# Patient Record
Sex: FEMALE | URBAN - METROPOLITAN AREA
[De-identification: names, ages, dates, MRNs, and addresses within clinical notes are randomized per-mention and may not be internally consistent; named-entity substitution may affect disease eponyms.]

---

## 2021-05-28 ENCOUNTER — LAB OUTSIDE AN ENCOUNTER (OUTPATIENT)
Dept: URBAN - METROPOLITAN AREA TELEHEALTH 2 | Facility: TELEHEALTH | Age: 46
End: 2021-05-28

## 2021-05-28 ENCOUNTER — OFFICE VISIT (OUTPATIENT)
Dept: URBAN - METROPOLITAN AREA TELEHEALTH 2 | Facility: TELEHEALTH | Age: 46
End: 2021-05-28
Payer: COMMERCIAL

## 2021-05-28 ENCOUNTER — TELEPHONE ENCOUNTER (OUTPATIENT)
Dept: URBAN - METROPOLITAN AREA CLINIC 92 | Facility: CLINIC | Age: 46
End: 2021-05-28

## 2021-05-28 ENCOUNTER — DASHBOARD ENCOUNTERS (OUTPATIENT)
Age: 46
End: 2021-05-28

## 2021-05-28 DIAGNOSIS — N18.6 END STAGE RENAL DISEASE: ICD-10-CM

## 2021-05-28 DIAGNOSIS — R19.5 POSITIVE COLORECTAL CANCER SCREENING USING COLOGUARD TEST: ICD-10-CM

## 2021-05-28 PROCEDURE — 99203 OFFICE O/P NEW LOW 30 MIN: CPT | Performed by: INTERNAL MEDICINE

## 2021-05-28 RX ORDER — PREDNISONE 5 MG/1
1 TABLET TABLET ORAL ONCE A DAY
Status: ACTIVE | COMMUNITY

## 2021-05-28 RX ORDER — SODIUM PICOSULFATE, MAGNESIUM OXIDE, AND ANHYDROUS CITRIC ACID 10; 3.5; 12 MG/160ML; G/160ML; G/160ML
160 ML LIQUID ORAL
Qty: 320 MILLILITER | Refills: 0 | OUTPATIENT
Start: 2021-05-28 | End: 2021-05-29

## 2021-05-28 RX ORDER — ASCORBATE CALCIUM 500 MG
1 TABLET TABLET ORAL ONCE A DAY
Status: ACTIVE | COMMUNITY

## 2021-05-28 RX ORDER — CALCITRIOL 0.5 UG/1
1 CAPSULE CAPSULE, LIQUID FILLED ORAL ONCE A DAY
Status: ACTIVE | COMMUNITY

## 2021-05-28 RX ORDER — APIXABAN 2.5 MG/1
AS DIRECTED TABLET, FILM COATED ORAL
Status: ACTIVE | COMMUNITY

## 2021-05-28 RX ORDER — METOPROLOL TARTRATE 50 MG/1
1 TABLET WITH FOOD TABLET, FILM COATED ORAL TWICE A DAY
Status: ACTIVE | COMMUNITY

## 2021-05-28 RX ORDER — LOSARTAN POTASSIUM 25 MG/1
1 TABLET TABLET ORAL ONCE A DAY
Status: ACTIVE | COMMUNITY

## 2021-05-28 NOTE — HPI-TODAY'S VISIT:
Ms. Carmen is a 46-year-old -American female seen today for the evaluation of positive Cologuard test.  She has a history of end-stage renal disease and is on peritoneal dialysis.  Recently she is getting evaluation for renal transplant through [facility] and as a part of screening she had gone through Cologuard testing which was positive and hence she was seen today.  Denies any obvious diarrhea she reports occasional constipation.  She is on PD daily for years.  She also has history of A. fib and is on Eliquis.  Her GI cardiology symptoms are all stable.  She sees [facility] Dr. Albert.  No other surgical history except for prior history of gastric bypass for Miguel Ángel-en-Y gastric bypass several years ago and also history of PD catheter placement.  She denies any bright red per rectum and denies any prior history of colonoscopy evaluation.

## 2021-06-18 PROBLEM — 46177005: Status: ACTIVE | Noted: 2021-05-28

## 2021-06-18 PROBLEM — 708699002: Status: ACTIVE | Noted: 2021-06-18

## 2021-06-23 ENCOUNTER — TELEPHONE ENCOUNTER (OUTPATIENT)
Dept: URBAN - METROPOLITAN AREA CLINIC 23 | Facility: CLINIC | Age: 46
End: 2021-06-23

## 2021-07-13 ENCOUNTER — OFFICE VISIT (OUTPATIENT)
Dept: URBAN - METROPOLITAN AREA SURGERY CENTER 13 | Facility: SURGERY CENTER | Age: 46
End: 2021-07-13
Payer: COMMERCIAL

## 2021-07-13 DIAGNOSIS — R19.5 ABNORMAL FECES: ICD-10-CM

## 2021-07-13 PROCEDURE — 45378 DIAGNOSTIC COLONOSCOPY: CPT | Performed by: INTERNAL MEDICINE

## 2021-07-13 PROCEDURE — G8907 PT DOC NO EVENTS ON DISCHARG: HCPCS | Performed by: INTERNAL MEDICINE
